# Patient Record
(demographics unavailable — no encounter records)

---

## 2024-11-12 NOTE — HISTORY OF PRESENT ILLNESS
[FreeTextEntry1] : Ms. Zaida Dawn is a 43-year-old woman with type 2 diabetes mellitus, hypertension, hyperlipidemia, hypothyroidism, and class 2 obesity presenting for follow up.   DIABETES HISTORY: - Age at diagnosis: 41 years old (2 years ago); she was told she had prediabetes a year before the diagnosis.  - Symptoms at Diagnosis: Hyperglycemia discovered during COVID-19 infection. - Current Therapy: Metformin 1,000 mg twice daily, Jardiance 10 mg daily and Mounjaro 2.5 mg weekly.  - History of other regimens: Glipizide 5 mg twice daily (hypoglycemia).  - Hypoglycemia: Denies.  - History of DKA/HHS: None. - Complications: She was last seen by an ophthalmologist around 3 years ago, and a dilated eye exam showed no retinopathy. Denies having neuropathy symptoms. - Home FSG: Fasting 120-130 mg/dL. Doesn't check during the rest of the day.   HYPOTHYROIDISM She was diagnosed with hypothyroidism 15 years ago, in the setting of irregular periods. She takes levothyroxine 150 mcg daily on an empty stomach, at least an hour before breakfast and only with water. She had initially started at 50 mcg, later increased to 150 mcg which has remained her dose over the past 8-9 years.  Menstrual cycles are now regular, occurring every 22 days and lasting for 5 days. She has a history of irregular cycles when diagnosed with hypothyroidism, which became regular after starting levothyroxine and oral contraceptive pills. She has taken oral contraceptives intermittently, trying to stop but restarting due to irregular periods (skipping cycles). She also reports excessive hair growth on her chin.  11/12/24 - Presents for follow-up of diabetes management and weight loss. No longer experiencing hypoglycemia after discontinuing Glipizide. Reports taking Mounjaro injections for the past 5 weeks, currently on 2.5 mg dose. She was never given the 5 mg dose I had prescribed for her to start after one month. Her weight has decreased from 234 lbs to 226 lbs since starting the medication. Denies any side effects from Mounjaro, except for occasional lower abdominal pain/pressure, especially after eating. No nausea or vomiting. Reports occasional constipation. Last menstrual period was on 11/02/2024, reports cycles have been regular while off birth control pills.

## 2024-11-12 NOTE — ASSESSMENT
[Weight Loss] : weight loss [FreeTextEntry1] : # Type 2 diabetes mellitus - Diabetes control has improved, with A1c now at 6.7% (goal <7%). Fasting blood sugars are also in target range of  mg/dL.  - CONTINUE metformin 1000 mg twice daily and Jardiance 10 mg daily. Refills sent to pharmacy. - INCREASE Mounjaro to 5 mg weekly to further optimize diabetes control and promote weight loss.  - Continue home blood sugar monitoring.  - Microvascular complications:     > Neuropathy: Monofilament within normal limits (9/2024).     > Nephropathy: Albumin/creatinine ratio collected earlier today.      > Retinopathy: Advised to reschedule an appointment with her ophthalmologist for a repeat dilated eye exam.    # Class 2 obesity - Has lost 8 lbs since starting Mounjaro 5 weeks ago.  - Increase Mounjaro to 5 mg weekly. Anticipate further weight loss on higher dose.  - Encouraged to continue lifestyle modifications with diet and exercise.  # Hyperlipidemia - Continue atorvastatin 20 mg nightly for now.  - Lipid profile obtained earlier today, pending results.  - Diet and lifestyle modification advised.  # Hypothyroidism - Continue with levothyroxine 150 mcg daily for now.  - Thyroid function tests obtained today, pending results.   # Irregular menstrual cycles / Excessive hair growth in her chin - I explained to her that this symptoms could be secondary to PCOS; however, we will rule out other causes by checking FSH, LH, Prolactin, 17-OHP, Total testosterone, DHEAS, Estradiol. - Reports menstrual cycles have been regular with last menstrual period earlier this month.   RTC in 3 months.

## 2024-11-12 NOTE — REVIEW OF SYSTEMS
[Recent Weight Loss (___ Lbs)] : recent weight loss: [unfilled] lbs [Constipation] : constipation [Abdominal Pain] : abdominal pain [Gas/Bloating] : gas/bloating [Fatigue] : no fatigue [Fever] : no fever [Chest Pain] : no chest pain [Palpitations] : no palpitations [Shortness Of Breath] : no shortness of breath [Nausea] : no nausea [Vomiting] : no vomiting [Irregular Menses] : regular menses

## 2025-01-16 NOTE — HISTORY OF PRESENT ILLNESS
[FreeTextEntry1] : Ms. Zaida Dawn is a 43-year-old woman with type 2 diabetes mellitus, hypertension, hyperlipidemia, hypothyroidism, and class 2 obesity presenting for follow up.   DIABETES HISTORY: - Age at diagnosis: 41 years old (2 years ago); she was told she had prediabetes a year before the diagnosis.  - Symptoms at Diagnosis: Hyperglycemia discovered during COVID-19 infection. - Current Therapy: Metformin 1,000 mg twice daily, Jardiance 10 mg daily and Mounjaro 2.5 mg weekly.  - History of other regimens: Glipizide 5 mg twice daily (hypoglycemia).  - Hypoglycemia: Denies.  - History of DKA/HHS: None. - Complications: She was last seen by an ophthalmologist around 3 years ago, and a dilated eye exam showed no retinopathy. Denies having neuropathy symptoms. - Home FSG: Fasting 120-130 mg/dL. Doesn't check during the rest of the day.   HYPOTHYROIDISM She was diagnosed with hypothyroidism 15 years ago, in the setting of irregular periods. She takes levothyroxine 150 mcg daily on an empty stomach, at least an hour before breakfast and only with water. She had initially started at 50 mcg, later increased to 150 mcg which has remained her dose over the past 8-9 years.  Menstrual cycles are now regular, occurring every 22 days and lasting for 5 days. She has a history of irregular cycles when diagnosed with hypothyroidism, which became regular after starting levothyroxine and oral contraceptive pills. She has taken oral contraceptives intermittently, trying to stop but restarting due to irregular periods (skipping cycles). She also reports excessive hair growth on her chin.  11/12/24 - Presents for follow-up of diabetes management and weight loss. No longer experiencing hypoglycemia after discontinuing Glipizide. Reports taking Mounjaro injections for the past 5 weeks, currently on 2.5 mg dose. She was never given the 5 mg dose I had prescribed for her to start after one month. Her weight has decreased from 234 lbs to 226 lbs since starting the medication. Denies any side effects from Mounjaro, except for occasional lower abdominal pain/pressure, especially after eating. No nausea or vomiting. Reports occasional constipation. Last menstrual period was on 11/02/2024, reports cycles have been regular while off birth control pills.   1/16/25 - Increased dose to 5 mg weekly, no side effects. Reports decreased appetite. Reports losing around 7 lbs since her last. Reports contiuing with 150 mcg daily. Traveling to [facility] next month. Fasting ~110-120 mg/dL. A1C was 6.6% today.

## 2025-01-16 NOTE — HISTORY OF PRESENT ILLNESS
[FreeTextEntry1] : Ms. Zaida Dawn is a 43-year-old woman with type 2 diabetes mellitus, hypertension, hyperlipidemia, hypothyroidism, and class 2 obesity presenting for follow up.   DIABETES HISTORY: - Age at diagnosis: 41 years old (2 years ago); she was told she had prediabetes a year before the diagnosis.  - Symptoms at Diagnosis: Hyperglycemia discovered during COVID-19 infection. - Current Therapy: Metformin 1,000 mg twice daily, Jardiance 10 mg daily and Mounjaro 2.5 mg weekly.  - History of other regimens: Glipizide 5 mg twice daily (hypoglycemia).  - Hypoglycemia: Denies.  - History of DKA/HHS: None. - Complications: She was last seen by an ophthalmologist around 3 years ago, and a dilated eye exam showed no retinopathy. Denies having neuropathy symptoms. - Home FSG: Fasting 120-130 mg/dL. Doesn't check during the rest of the day.   HYPOTHYROIDISM She was diagnosed with hypothyroidism 15 years ago, in the setting of irregular periods. She takes levothyroxine 150 mcg daily on an empty stomach, at least an hour before breakfast and only with water. She had initially started at 50 mcg, later increased to 150 mcg which has remained her dose over the past 8-9 years.  Menstrual cycles are now regular, occurring every 22 days and lasting for 5 days. She has a history of irregular cycles when diagnosed with hypothyroidism, which became regular after starting levothyroxine and oral contraceptive pills. She has taken oral contraceptives intermittently, trying to stop but restarting due to irregular periods (skipping cycles). She also reports excessive hair growth on her chin.  11/12/24 - Presents for follow-up of diabetes management and weight loss. No longer experiencing hypoglycemia after discontinuing Glipizide. Reports taking Mounjaro injections for the past 5 weeks, currently on 2.5 mg dose. She was never given the 5 mg dose I had prescribed for her to start after one month. Her weight has decreased from 234 lbs to 226 lbs since starting the medication. Denies any side effects from Mounjaro, except for occasional lower abdominal pain/pressure, especially after eating. No nausea or vomiting. Reports occasional constipation. Last menstrual period was on 11/02/2024, reports cycles have been regular while off birth control pills.   1/16/25 - Increased dose to 5 mg weekly, no side effects. Reports decreased appetite. Reports losing around 7 lbs since her last. Reports contiuing with 150 mcg daily. Traveling to [facility] next month. Fasting ~110-120 mg/dL. A1C was 6.6% today.

## 2025-01-16 NOTE — ASSESSMENT
[FreeTextEntry1] : # Type 2 diabetes mellitus - Adequate glycemic control, with A1c at 6.6% (goal <7%). Fasting blood sugars are also in target range of  mg/dL.  - CONTINUE metformin 1000 mg twice daily and Jardiance 10 mg daily. Refills sent to pharmacy. - INCREASE Mounjaro to 7.5 mg weekly to promote weight loss. Advised on dietary modifications and to increase physical activity. Referred to nutritionist for further dietary advise.  - Microvascular complications:     > Neuropathy: Monofilament within normal limits (9/2024).     > Nephropathy: Albumin/creatinine ratio within normal limits (11/2024).      > Retinopathy: Advised to reschedule an appointment with her ophthalmologist for a repeat dilated eye exam.    # Class 2 obesity - Has lost 8 lbs since starting Mounjaro 5 weeks ago.  - Increase Mounjaro to 5 mg weekly. Anticipate further weight loss on higher dose.  - Encouraged to continue lifestyle modifications with diet and exercise.  # Hyperlipidemia - Continue atorvastatin 20 mg nightly for now.  - Lipid profile obtained earlier today, pending results.  - Diet and lifestyle modification advised.  # Hypothyroidism - Continue with levothyroxine 150 mcg daily for now.  - Thyroid function tests obtained today, pending results.   # Irregular menstrual cycles / Excessive hair growth in her chin - I explained to her that this symptoms could be secondary to PCOS; however, we will rule out other causes by checking FSH, LH, Prolactin, 17-OHP, Total testosterone, DHEAS, Estradiol. - Reports menstrual cycles have been regular with last menstrual period earlier this month.   RTC in 3 months.

## 2025-06-25 NOTE — ASSESSMENT
[FreeTextEntry1] : # Type 2 diabetes mellitus -Excellent glycemic control with A1c at 6.2% (goal <7%).  - CONTINUE metformin 1000 mg twice daily, Jardiance 10 mg daily and Mounjaro 7.5 mg weekly. Refills sent to pharmacy. - Microvascular complications:     > Neuropathy: Monofilament within normal limits (9/2024).     > Nephropathy: Albumin/creatinine ratio within normal limits (11/2024).      > Retinopathy: No retinopathy. Has appointment with ophthalmology for September for annual screening.    # Class 2 obesity - Has lost 22 lbs since starting Mounjaro. - Continue Mounjaro to 7.5 mg weekly.  - She reports adequate appetite suppression from the medication. Maintaining current Mounjaro dose of 7.5 mg as patient is still experiencing weight loss, albeit at a slower rate. Discussed that as weight decreases, the rate of weight loss typically slows due to metabolic adaptation. - Discussed potential for further caloric restriction to enhance weight loss efforts. - Encouraged to continue lifestyle modifications with diet and exercise.  # Hyperlipidemia - Continue atorvastatin 20 mg nightly.  - Check lipid profile.   # Hypothyroidism - Continue with levothyroxine 150 mcg daily.  - Repeat TFT.   RTC in 3 months.

## 2025-06-25 NOTE — REVIEW OF SYSTEMS
[Recent Weight Loss (___ Lbs)] : recent weight loss: [unfilled] lbs [Fatigue] : no fatigue [Fever] : no fever [Chest Pain] : no chest pain [Shortness Of Breath] : no shortness of breath [Nausea] : no nausea [Constipation] : no constipation [Vomiting] : no vomiting [Diarrhea] : no diarrhea

## 2025-06-25 NOTE — HISTORY OF PRESENT ILLNESS
[FreeTextEntry1] : Ms. Zaida Dawn is a 44-year-old woman with type 2 diabetes mellitus, hypertension, hyperlipidemia, hypothyroidism, and class 2 obesity presenting for follow up.   DIABETES HISTORY: - Age at diagnosis: 41 years old (2 years ago); she was told she had prediabetes a year before the diagnosis.  - Symptoms at Diagnosis: Hyperglycemia discovered during COVID-19 infection. - Current Therapy: Metformin 1,000 mg twice daily, Jardiance 10 mg daily and Mounjaro 5 mg weekly.  - History of other regimens: Glipizide 5 mg twice daily (hypoglycemia).  - Hypoglycemia: Denies.  - History of DKA/HHS: None. - Home FSG: Fasting 110-120 mg/dL. Doesn't check during the rest of the day.   HYPOTHYROIDISM She was diagnosed with hypothyroidism 15 years ago, in the setting of irregular periods. She takes levothyroxine 150 mcg daily on an empty stomach, at least an hour before breakfast and only with water. She had initially started at 50 mcg, later increased to 150 mcg which has remained her dose over the past 8-9 years.  1/16/25 - Ms. Dawn is here for a follow-up visit today. She has increased her dose of Mounjaro to 5 mg weekly, as previously advised, and reports experiencing no side effects. She notes a decrease in appetite while on the medication and has lost approximately 7 lbs since her last visit. She typically checks her glucose levels in the morning before breakfast, with fasting levels ranging from ~110-120 mg/dL. Her A1C today was 6.6%, indicating adequate glycemic control. She denies experiencing any hypoglycemic events. She continues to take levothyroxine at 150 mcg daily, with her most recent thyroid function tests showing values within normal limits. She mentioned that she plans to travel to [facility] next month.  6/25/25 - Ms. Dawn presents for follow-up on diabetes management, weight management and hypothyroidism. During her last visit, her Mounjaro dose was increased to 7.5 mg to promote weight loss. She reports no side effects from this medication increase. She is currently taking metformin 1,000 mg twice daily and Jardiance 10 mg, with no changes to these medications since her last visit. Ms. Dawn is up to date with her monofilament test and urine ACR. She had an eye exam appointment scheduled but had to postpone it due to travel. The appointment has been rescheduled for September. Regarding weight loss, Ms. Dawn reports losing approximately 22 pounds since starting Mounjaro. Her home scale yesterday showed a weight of 212 pounds. She feels the medication is helping with appetite suppression. Her weight has slowed down slightly over the past five weeks despite maintaining a restrictive diet. She is currently taking levothyroxine 150 mcg. She requested prescription renewals for her medications and a new blood glucose monitoring machine, specifically an Accu-Chek, as she reports the pharmacy did not provide the machine with her previous prescription.